# Patient Record
Sex: MALE | Race: WHITE | NOT HISPANIC OR LATINO | URBAN - METROPOLITAN AREA
[De-identification: names, ages, dates, MRNs, and addresses within clinical notes are randomized per-mention and may not be internally consistent; named-entity substitution may affect disease eponyms.]

---

## 2024-06-19 ENCOUNTER — OFFICE (OUTPATIENT)
Dept: URBAN - METROPOLITAN AREA CLINIC 10 | Facility: CLINIC | Age: 39
End: 2024-06-19

## 2024-06-19 VITALS
HEART RATE: 100 BPM | HEIGHT: 67 IN | OXYGEN SATURATION: 96 % | DIASTOLIC BLOOD PRESSURE: 95 MMHG | SYSTOLIC BLOOD PRESSURE: 144 MMHG | WEIGHT: 243 LBS

## 2024-06-19 DIAGNOSIS — R19.7 DIARRHEA, UNSPECIFIED: ICD-10-CM

## 2024-06-19 DIAGNOSIS — Z85.6 PERSONAL HISTORY OF LEUKEMIA: ICD-10-CM

## 2024-06-19 DIAGNOSIS — K21.9 GASTRO-ESOPHAGEAL REFLUX DISEASE WITHOUT ESOPHAGITIS: ICD-10-CM

## 2024-06-19 PROCEDURE — 99204 OFFICE O/P NEW MOD 45 MIN: CPT | Performed by: REGISTERED NURSE

## 2024-06-19 RX ORDER — OMEPRAZOLE 20 MG/1
20 CAPSULE, DELAYED RELEASE ORAL
Qty: 90 | Refills: 2 | Status: ACTIVE
Start: 2024-06-19

## 2024-06-19 NOTE — SERVICEHPINOTES
Mr. Yañez is a 38-year-old male that is being referred by Dr. Ernie christopher MD for evaluation for colonoscopy. He has a past medical history of MI status post cardiac stents, diabetes, hypertension, elevated cholesterol, chronic kidney disease, pancreatitis, and thyroid disease. He also has a history of CML and received chemo and radiation at [facility]. He was following up outpatient and was informed that he should have started getting colonoscopies around age 26 due to the type of cancer and treatment that he received. He denies having a family history of colon cancer. He did have a heart attack with stent placement in September 2023. He is currently taking Brilinta twice a day. His cardiologist is Dr. Yip. His next follow up with Cardiology as in July. He denies having any abdominal pain, hematochezia or melena. He does report having a long history of loose stool. He states that has been ongoing for years. He will occasionally have a normal formed stool but describes having days with 2-4 loose stools. He has been taking Pepto-Bismol for his symptoms.He also has been experiencing some heartburn and indigestion for the past month. His symptoms occur at least 2-3 days per week. He takes Tums as needed. He denies having any nausea, vomiting, or dysphagia. He denies using NSAIDs. Occasionally he experiences some regurgitation if he lays down soon after eating. He has not been on any prescription medications for reflux in the past.